# Patient Record
Sex: MALE | Race: BLACK OR AFRICAN AMERICAN | NOT HISPANIC OR LATINO | URBAN - METROPOLITAN AREA
[De-identification: names, ages, dates, MRNs, and addresses within clinical notes are randomized per-mention and may not be internally consistent; named-entity substitution may affect disease eponyms.]

---

## 2020-10-01 ENCOUNTER — HOSPITAL ENCOUNTER (EMERGENCY)
Facility: HOSPITAL | Age: 30
Discharge: PSYCHIATRIC OR SUBSTANCE USE FACILITY | End: 2020-10-01
Attending: EMERGENCY MEDICINE
Payer: OTHER GOVERNMENT

## 2020-10-01 ENCOUNTER — HOSPITAL ENCOUNTER (INPATIENT)
Facility: HOSPITAL | Age: 30
LOS: 4 days | Discharge: HOME | DRG: 880 | End: 2020-10-05
Attending: PSYCHIATRY & NEUROLOGY | Admitting: PSYCHIATRY & NEUROLOGY

## 2020-10-01 VITALS
OXYGEN SATURATION: 100 % | TEMPERATURE: 98 F | HEIGHT: 72 IN | HEART RATE: 66 BPM | BODY MASS INDEX: 23.7 KG/M2 | WEIGHT: 175 LBS | SYSTOLIC BLOOD PRESSURE: 120 MMHG | DIASTOLIC BLOOD PRESSURE: 64 MMHG | RESPIRATION RATE: 16 BRPM

## 2020-10-01 DIAGNOSIS — E87.6 HYPOKALEMIA: ICD-10-CM

## 2020-10-01 DIAGNOSIS — R45.851 SUICIDAL IDEATIONS: Primary | ICD-10-CM

## 2020-10-01 DIAGNOSIS — F19.10 SUBSTANCE ABUSE: ICD-10-CM

## 2020-10-01 DIAGNOSIS — R45.851 DEPRESSION WITH SUICIDAL IDEATION: ICD-10-CM

## 2020-10-01 DIAGNOSIS — F32.A DEPRESSION WITH SUICIDAL IDEATION: ICD-10-CM

## 2020-10-01 DIAGNOSIS — F41.9 ANXIETY: ICD-10-CM

## 2020-10-01 DIAGNOSIS — Z65.8 PSYCHOSOCIAL STRESSORS: ICD-10-CM

## 2020-10-01 DIAGNOSIS — F32.1 CURRENT MODERATE EPISODE OF MAJOR DEPRESSIVE DISORDER WITHOUT PRIOR EPISODE: Primary | ICD-10-CM

## 2020-10-01 LAB
ALBUMIN SERPL BCP-MCNC: 4.4 G/DL (ref 3.5–5.2)
ALP SERPL-CCNC: 55 U/L (ref 55–135)
ALT SERPL W/O P-5'-P-CCNC: 15 U/L (ref 10–44)
AMPHET+METHAMPHET UR QL: NEGATIVE
ANION GAP SERPL CALC-SCNC: 16 MMOL/L (ref 8–16)
APAP SERPL-MCNC: <3 UG/ML (ref 10–20)
AST SERPL-CCNC: 24 U/L (ref 10–40)
BACTERIA #/AREA URNS AUTO: NORMAL /HPF
BARBITURATES UR QL SCN>200 NG/ML: NEGATIVE
BASOPHILS # BLD AUTO: 0.03 K/UL (ref 0–0.2)
BASOPHILS NFR BLD: 0.4 % (ref 0–1.9)
BENZODIAZ UR QL SCN>200 NG/ML: NEGATIVE
BILIRUB SERPL-MCNC: 0.6 MG/DL (ref 0.1–1)
BILIRUB UR QL STRIP: NEGATIVE
BUN SERPL-MCNC: 10 MG/DL (ref 6–20)
BZE UR QL SCN: NEGATIVE
CALCIUM SERPL-MCNC: 9 MG/DL (ref 8.7–10.5)
CANNABINOIDS UR QL SCN: ABNORMAL
CHLORIDE SERPL-SCNC: 107 MMOL/L (ref 95–110)
CLARITY UR REFRACT.AUTO: CLEAR
CO2 SERPL-SCNC: 20 MMOL/L (ref 23–29)
COLOR UR AUTO: YELLOW
CREAT SERPL-MCNC: 1.4 MG/DL (ref 0.5–1.4)
CREAT UR-MCNC: 417 MG/DL (ref 23–375)
CTP QC/QA: YES
DIFFERENTIAL METHOD: ABNORMAL
EOSINOPHIL # BLD AUTO: 0 K/UL (ref 0–0.5)
EOSINOPHIL NFR BLD: 0.1 % (ref 0–8)
ERYTHROCYTE [DISTWIDTH] IN BLOOD BY AUTOMATED COUNT: 11.9 % (ref 11.5–14.5)
EST. GFR  (AFRICAN AMERICAN): >60 ML/MIN/1.73 M^2
EST. GFR  (NON AFRICAN AMERICAN): >60 ML/MIN/1.73 M^2
ETHANOL SERPL-MCNC: <10 MG/DL
GLUCOSE SERPL-MCNC: 108 MG/DL (ref 70–110)
GLUCOSE UR QL STRIP: NEGATIVE
HCT VFR BLD AUTO: 41 % (ref 40–54)
HGB BLD-MCNC: 13.8 G/DL (ref 14–18)
HGB UR QL STRIP: NEGATIVE
HYALINE CASTS UR QL AUTO: 0 /LPF
IMM GRANULOCYTES # BLD AUTO: 0.03 K/UL (ref 0–0.04)
IMM GRANULOCYTES NFR BLD AUTO: 0.4 % (ref 0–0.5)
KETONES UR QL STRIP: ABNORMAL
LEUKOCYTE ESTERASE UR QL STRIP: NEGATIVE
LYMPHOCYTES # BLD AUTO: 1.3 K/UL (ref 1–4.8)
LYMPHOCYTES NFR BLD: 16.7 % (ref 18–48)
MCH RBC QN AUTO: 28.9 PG (ref 27–31)
MCHC RBC AUTO-ENTMCNC: 33.7 G/DL (ref 32–36)
MCV RBC AUTO: 86 FL (ref 82–98)
METHADONE UR QL SCN>300 NG/ML: NEGATIVE
MICROSCOPIC COMMENT: NORMAL
MONOCYTES # BLD AUTO: 0.5 K/UL (ref 0.3–1)
MONOCYTES NFR BLD: 6.1 % (ref 4–15)
NEUTROPHILS # BLD AUTO: 6 K/UL (ref 1.8–7.7)
NEUTROPHILS NFR BLD: 76.3 % (ref 38–73)
NITRITE UR QL STRIP: NEGATIVE
NRBC BLD-RTO: 0 /100 WBC
OPIATES UR QL SCN: NEGATIVE
PCP UR QL SCN>25 NG/ML: NEGATIVE
PH UR STRIP: 6 [PH] (ref 5–8)
PLATELET # BLD AUTO: 188 K/UL (ref 150–350)
PMV BLD AUTO: 10.1 FL (ref 9.2–12.9)
POTASSIUM SERPL-SCNC: 3.3 MMOL/L (ref 3.5–5.1)
PROT SERPL-MCNC: 7.8 G/DL (ref 6–8.4)
PROT UR QL STRIP: ABNORMAL
RBC # BLD AUTO: 4.78 M/UL (ref 4.6–6.2)
RBC #/AREA URNS AUTO: 1 /HPF (ref 0–4)
SARS-COV-2 RDRP RESP QL NAA+PROBE: NEGATIVE
SODIUM SERPL-SCNC: 143 MMOL/L (ref 136–145)
SP GR UR STRIP: 1.03 (ref 1–1.03)
T4 FREE SERPL-MCNC: 1.34 NG/DL (ref 0.71–1.51)
TOXICOLOGY INFORMATION: ABNORMAL
TSH SERPL DL<=0.005 MIU/L-ACNC: 0.32 UIU/ML (ref 0.4–4)
URN SPEC COLLECT METH UR: ABNORMAL
WBC # BLD AUTO: 7.91 K/UL (ref 3.9–12.7)
WBC #/AREA URNS AUTO: 2 /HPF (ref 0–5)

## 2020-10-01 PROCEDURE — 80320 DRUG SCREEN QUANTALCOHOLS: CPT

## 2020-10-01 PROCEDURE — 11400000 HC PSYCH PRIVATE ROOM

## 2020-10-01 PROCEDURE — 25000003 PHARM REV CODE 250: Performed by: PHYSICIAN ASSISTANT

## 2020-10-01 PROCEDURE — 84439 ASSAY OF FREE THYROXINE: CPT

## 2020-10-01 PROCEDURE — U0002 COVID-19 LAB TEST NON-CDC: HCPCS | Performed by: PHYSICIAN ASSISTANT

## 2020-10-01 PROCEDURE — 80329 ANALGESICS NON-OPIOID 1 OR 2: CPT

## 2020-10-01 PROCEDURE — 99284 EMERGENCY DEPT VISIT MOD MDM: CPT | Mod: ,,, | Performed by: PHYSICIAN ASSISTANT

## 2020-10-01 PROCEDURE — 99285 EMERGENCY DEPT VISIT HI MDM: CPT

## 2020-10-01 PROCEDURE — 81001 URINALYSIS AUTO W/SCOPE: CPT

## 2020-10-01 PROCEDURE — 99284 PR EMERGENCY DEPT VISIT,LEVEL IV: ICD-10-PCS | Mod: ,,, | Performed by: PHYSICIAN ASSISTANT

## 2020-10-01 PROCEDURE — 80053 COMPREHEN METABOLIC PANEL: CPT

## 2020-10-01 PROCEDURE — 85025 COMPLETE CBC W/AUTO DIFF WBC: CPT

## 2020-10-01 PROCEDURE — 25000003 PHARM REV CODE 250: Performed by: PSYCHIATRY & NEUROLOGY

## 2020-10-01 PROCEDURE — 80307 DRUG TEST PRSMV CHEM ANLYZR: CPT

## 2020-10-01 PROCEDURE — 84443 ASSAY THYROID STIM HORMONE: CPT

## 2020-10-01 RX ORDER — MAG HYDROX/ALUMINUM HYD/SIMETH 200-200-20
30 SUSPENSION, ORAL (FINAL DOSE FORM) ORAL EVERY 6 HOURS PRN
Status: DISCONTINUED | OUTPATIENT
Start: 2020-10-01 | End: 2020-10-05 | Stop reason: HOSPADM

## 2020-10-01 RX ORDER — ACETAMINOPHEN 325 MG/1
650 TABLET ORAL EVERY 6 HOURS PRN
Status: DISCONTINUED | OUTPATIENT
Start: 2020-10-01 | End: 2020-10-05 | Stop reason: HOSPADM

## 2020-10-01 RX ORDER — LOPERAMIDE HYDROCHLORIDE 2 MG/1
2 CAPSULE ORAL
Status: DISCONTINUED | OUTPATIENT
Start: 2020-10-01 | End: 2020-10-05 | Stop reason: HOSPADM

## 2020-10-01 RX ORDER — POTASSIUM CHLORIDE 20 MEQ/1
40 TABLET, EXTENDED RELEASE ORAL
Status: COMPLETED | OUTPATIENT
Start: 2020-10-01 | End: 2020-10-01

## 2020-10-01 RX ORDER — HYDROXYZINE PAMOATE 50 MG/1
50 CAPSULE ORAL EVERY 6 HOURS PRN
Status: DISCONTINUED | OUTPATIENT
Start: 2020-10-01 | End: 2020-10-05 | Stop reason: HOSPADM

## 2020-10-01 RX ORDER — OLANZAPINE 10 MG/2ML
10 INJECTION, POWDER, FOR SOLUTION INTRAMUSCULAR EVERY 4 HOURS PRN
Status: DISCONTINUED | OUTPATIENT
Start: 2020-10-01 | End: 2020-10-05 | Stop reason: HOSPADM

## 2020-10-01 RX ORDER — FOLIC ACID 1 MG/1
1 TABLET ORAL DAILY
Status: DISCONTINUED | OUTPATIENT
Start: 2020-10-01 | End: 2020-10-05 | Stop reason: HOSPADM

## 2020-10-01 RX ORDER — DOCUSATE SODIUM 100 MG/1
100 CAPSULE, LIQUID FILLED ORAL DAILY PRN
Status: DISCONTINUED | OUTPATIENT
Start: 2020-10-01 | End: 2020-10-05 | Stop reason: HOSPADM

## 2020-10-01 RX ORDER — ONDANSETRON 8 MG/1
8 TABLET, ORALLY DISINTEGRATING ORAL
Status: COMPLETED | OUTPATIENT
Start: 2020-10-01 | End: 2020-10-01

## 2020-10-01 RX ORDER — OLANZAPINE 10 MG/1
10 TABLET ORAL EVERY 4 HOURS PRN
Status: DISCONTINUED | OUTPATIENT
Start: 2020-10-01 | End: 2020-10-05 | Stop reason: HOSPADM

## 2020-10-01 RX ADMIN — FOLIC ACID 1 MG: 1 TABLET ORAL at 04:10

## 2020-10-01 RX ADMIN — THERA TABS 1 TABLET: TAB at 04:10

## 2020-10-01 RX ADMIN — POTASSIUM CHLORIDE 40 MEQ: 1500 TABLET, EXTENDED RELEASE ORAL at 10:10

## 2020-10-01 RX ADMIN — ONDANSETRON 8 MG: 8 TABLET, ORALLY DISINTEGRATING ORAL at 09:10
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
## 2020-10-01 NOTE — ED NOTES
Pt report called to Akira Richards at [facility]. Pt is aware of his pending transfer and has notified family and friends.

## 2020-10-01 NOTE — ED NOTES
Pt transferred to  2, he is calm and cooperative. Pt checked for contraband and wearing hospital attire. Pt belongings secured. Pt endorses feelings of depression and thoughts of self harm without a specific plan. Pt denies AVH/HI's. Pt thoughts are future oriented ( he is concerned with loosing his job). Pt stated mood depressed, affect flat, he does contract against self harm. Pt appearance is well kept. Pt instructed on his pending placement to  [facility]. Pt lying in bed resting DVC maintained.
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
## 2020-10-01 NOTE — ED NOTES
Patient is resting comfortably in stretcher. Rafaela Hernandez at bedside documenting per flowsheets. All belongings locked up in closet. No medical equipment in room. Will continue to monitor.

## 2020-10-01 NOTE — ED NOTES
Pt escorted off the unit into the AA on a stretcher. AA staff given PEC, and 3 bags of pt belonginns with a security envelope. Pt remained calm and cooperative for the transfer.

## 2020-10-02 PROBLEM — E87.6 HYPOKALEMIA: Status: ACTIVE | Noted: 2020-10-02

## 2020-10-02 PROBLEM — F19.10 SUBSTANCE ABUSE: Status: ACTIVE | Noted: 2020-10-02

## 2020-10-02 LAB
CHOLEST SERPL-MCNC: 158 MG/DL (ref 120–199)
CHOLEST/HDLC SERPL: 3.8 {RATIO} (ref 2–5)
ESTIMATED AVG GLUCOSE: 111 MG/DL (ref 68–131)
HBA1C MFR BLD HPLC: 5.5 % (ref 4–5.6)
HDLC SERPL-MCNC: 42 MG/DL (ref 40–75)
HDLC SERPL: 26.6 % (ref 20–50)
LDLC SERPL CALC-MCNC: 102.6 MG/DL (ref 63–159)
NONHDLC SERPL-MCNC: 116 MG/DL
TRIGL SERPL-MCNC: 67 MG/DL (ref 30–150)

## 2020-10-02 PROCEDURE — 99223 PR INITIAL HOSPITAL CARE,LEVL III: ICD-10-PCS | Mod: ,,, | Performed by: STUDENT IN AN ORGANIZED HEALTH CARE EDUCATION/TRAINING PROGRAM

## 2020-10-02 PROCEDURE — 11400000 HC PSYCH PRIVATE ROOM

## 2020-10-02 PROCEDURE — 83036 HEMOGLOBIN GLYCOSYLATED A1C: CPT

## 2020-10-02 PROCEDURE — 99223 1ST HOSP IP/OBS HIGH 75: CPT | Mod: ,,, | Performed by: STUDENT IN AN ORGANIZED HEALTH CARE EDUCATION/TRAINING PROGRAM

## 2020-10-02 PROCEDURE — 25000003 PHARM REV CODE 250: Performed by: NURSE PRACTITIONER

## 2020-10-02 PROCEDURE — 99223 PR INITIAL HOSPITAL CARE,LEVL III: ICD-10-PCS | Mod: ,,, | Performed by: NURSE PRACTITIONER

## 2020-10-02 PROCEDURE — 25000003 PHARM REV CODE 250: Performed by: PSYCHIATRY & NEUROLOGY

## 2020-10-02 PROCEDURE — 25000003 PHARM REV CODE 250: Performed by: STUDENT IN AN ORGANIZED HEALTH CARE EDUCATION/TRAINING PROGRAM

## 2020-10-02 PROCEDURE — 36415 COLL VENOUS BLD VENIPUNCTURE: CPT

## 2020-10-02 PROCEDURE — 99223 1ST HOSP IP/OBS HIGH 75: CPT | Mod: ,,, | Performed by: NURSE PRACTITIONER

## 2020-10-02 PROCEDURE — 80061 LIPID PANEL: CPT

## 2020-10-02 RX ORDER — ESCITALOPRAM OXALATE 10 MG/1
10 TABLET ORAL DAILY
Status: DISCONTINUED | OUTPATIENT
Start: 2020-10-02 | End: 2020-10-05 | Stop reason: HOSPADM

## 2020-10-02 RX ORDER — POTASSIUM CHLORIDE 750 MG/1
30 TABLET, EXTENDED RELEASE ORAL ONCE
Status: COMPLETED | OUTPATIENT
Start: 2020-10-02 | End: 2020-10-02

## 2020-10-02 RX ADMIN — POTASSIUM CHLORIDE 30 MEQ: 750 TABLET, FILM COATED, EXTENDED RELEASE ORAL at 01:10

## 2020-10-02 RX ADMIN — THERA TABS 1 TABLET: TAB at 08:10

## 2020-10-02 RX ADMIN — FOLIC ACID 1 MG: 1 TABLET ORAL at 08:10

## 2020-10-02 RX ADMIN — ESCITALOPRAM OXALATE 10 MG: 10 TABLET ORAL at 06:10
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
## 2020-10-02 NOTE — SUBJECTIVE & OBJECTIVE
Past Medical History:   Diagnosis Date    Depression     Not formally diagnosed       No past surgical history on file.    Review of patient's allergies indicates:  No Known Allergies    No current facility-administered medications on file prior to encounter.      No current outpatient medications on file prior to encounter.     Family History     None        Tobacco Use    Smoking status: Not on file   Substance and Sexual Activity    Alcohol use: Not on file    Drug use: Not on file    Sexual activity: Not on file     Review of Systems   Constitutional: Negative for chills and fever.   HENT: Negative for congestion and sore throat.    Respiratory: Negative for cough, chest tightness and shortness of breath.    Cardiovascular: Negative for chest pain, palpitations and leg swelling.   Gastrointestinal: Negative for abdominal pain, diarrhea, nausea and vomiting.   Genitourinary: Negative for flank pain, frequency and hematuria.   Musculoskeletal: Negative for back pain and neck pain.   Skin: Negative for rash and wound.   Neurological: Negative for dizziness, seizures, syncope and headaches.   Psychiatric/Behavioral: Positive for decreased concentration and suicidal ideas. Negative for agitation, confusion, self-injury and sleep disturbance.     Objective:     Vital Signs (Most Recent):  Temp: 98.4 °F (36.9 °C) (10/02/20 0747)  Pulse: 71 (10/02/20 0747)  Resp: 18 (10/02/20 0747)  BP: (!) 113/53 (10/02/20 0747)  SpO2: 99 % (10/01/20 1546) Vital Signs (24h Range):  Temp:  [98 °F (36.7 °C)-98.4 °F (36.9 °C)] 98.4 °F (36.9 °C)  Pulse:  [66-87] 71  Resp:  [16-18] 18  SpO2:  [99 %] 99 %  BP: (113-132)/(53-73) 113/53     Weight: 79.4 kg (175 lb)  Body mass index is 23.73 kg/m².    Physical Exam  Vitals signs and nursing note reviewed.   Constitutional:       General: He is not in acute distress.     Appearance: He is well-developed.   HENT:      Head: Normocephalic and atraumatic.   Eyes:      Pupils: Pupils are equal,  round, and reactive to light.   Neck:      Thyroid: No thyromegaly.   Cardiovascular:      Rate and Rhythm: Normal rate and regular rhythm.      Heart sounds: Normal heart sounds. No murmur.   Pulmonary:      Effort: Pulmonary effort is normal. No respiratory distress.      Breath sounds: Normal breath sounds. No wheezing or rales.   Abdominal:      General: Bowel sounds are normal. There is no distension.      Palpations: Abdomen is soft. There is no mass.      Tenderness: There is no abdominal tenderness.   Musculoskeletal: Normal range of motion.         General: No deformity.   Lymphadenopathy:      Cervical: No cervical adenopathy.   Skin:     General: Skin is warm and dry.      Findings: No erythema or rash.   Neurological:      Mental Status: He is alert and oriented to person, place, and time.      Comments: CN II visual fields full to confrontation  CN III, Iv, VI- pupils ERRL   CN III- no palsy  Nystagmus; none   Diplopia- none  ophthalmoparesis- none  CN V- facial sensation intact  CN VII- facial expression full and symmetric  CNVIII- normal  CN IV-Normal  CN X- normal  CN XI- Normal   CN XII normal      Psychiatric:         Mood and Affect: Mood normal.         Behavior: Behavior normal.      Comments: Cooperative and pleasant          Significant Labs:   UPT  No results found for this or any previous visit.  U/A  No results found for this or any previous visit.  UDS  Results for orders placed or performed during the hospital encounter of 10/01/20   Drug screen panel, emergency   Result Value Ref Range    Benzodiazepines Negative     Methadone metabolites Negative     Cocaine (Metab.) Negative     Opiate Scrn, Ur Negative     Barbiturate Screen, Ur Negative     Amphetamine Screen, Ur Negative     THC Presumptive Positive     Phencyclidine Negative     Creatinine, Random Ur 417.0 (H) 23.0 - 375.0 mg/dL    Toxicology Information SEE COMMENT      CBC  Results for orders placed or performed during the  hospital encounter of 10/01/20   CBC auto differential   Result Value Ref Range    WBC 7.91 3.90 - 12.70 K/uL    RBC 4.78 4.60 - 6.20 M/uL    Hemoglobin 13.8 (L) 14.0 - 18.0 g/dL    Hematocrit 41.0 40.0 - 54.0 %    Mean Corpuscular Volume 86 82 - 98 fL    Mean Corpuscular Hemoglobin 28.9 27.0 - 31.0 pg    Mean Corpuscular Hemoglobin Conc 33.7 32.0 - 36.0 g/dL    RDW 11.9 11.5 - 14.5 %    Platelets 188 150 - 350 K/uL    MPV 10.1 9.2 - 12.9 fL    Immature Granulocytes 0.4 0.0 - 0.5 %    Gran # (ANC) 6.0 1.8 - 7.7 K/uL    Immature Grans (Abs) 0.03 0.00 - 0.04 K/uL    Lymph # 1.3 1.0 - 4.8 K/uL    Mono # 0.5 0.3 - 1.0 K/uL    Eos # 0.0 0.0 - 0.5 K/uL    Baso # 0.03 0.00 - 0.20 K/uL    nRBC 0 0 /100 WBC    Gran% 76.3 (H) 38.0 - 73.0 %    Lymph% 16.7 (L) 18.0 - 48.0 %    Mono% 6.1 4.0 - 15.0 %    Eosinophil% 0.1 0.0 - 8.0 %    Basophil% 0.4 0.0 - 1.9 %    Differential Method Automated      CMP  Results for orders placed or performed during the hospital encounter of 10/01/20   Comprehensive metabolic panel   Result Value Ref Range    Sodium 143 136 - 145 mmol/L    Potassium 3.3 (L) 3.5 - 5.1 mmol/L    Chloride 107 95 - 110 mmol/L    CO2 20 (L) 23 - 29 mmol/L    Glucose 108 70 - 110 mg/dL    BUN, Bld 10 6 - 20 mg/dL    Creatinine 1.4 0.5 - 1.4 mg/dL    Calcium 9.0 8.7 - 10.5 mg/dL    Total Protein 7.8 6.0 - 8.4 g/dL    Albumin 4.4 3.5 - 5.2 g/dL    Total Bilirubin 0.6 0.1 - 1.0 mg/dL    Alkaline Phosphatase 55 55 - 135 U/L    AST 24 10 - 40 U/L    ALT 15 10 - 44 U/L    Anion Gap 16 8 - 16 mmol/L    eGFR if African American >60.0 >60 mL/min/1.73 m^2    eGFR if non African American >60.0 >60 mL/min/1.73 m^2     TSH  Results for orders placed or performed during the hospital encounter of 10/01/20   TSH   Result Value Ref Range    TSH 0.324 (L) 0.400 - 4.000 uIU/mL     ETOH  Results for orders placed or performed during the hospital encounter of 10/01/20   Ethanol   Result Value Ref Range    Alcohol, Medical, Serum <10 <10  mg/dL     Salicylate  No results found for this or any previous visit.  Acetaminophen  Results for orders placed or performed during the hospital encounter of 10/01/20   Acetaminophen level   Result Value Ref Range    Acetaminophen (Tylenol), Serum <3.0 (L) 10.0 - 20.0 ug/mL       Significant Imaging: none
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
## 2020-10-02 NOTE — PLAN OF CARE
Pt calm and cooperative.  Denies any S/I or H/I at this time.  Behavior appropriate.  Interacting well with others.  No acute distress apparent at this time, will continue to monitor.

## 2020-10-02 NOTE — PLAN OF CARE
The bio psychosocial assessment was not done on this patient due to the patient being discharged less than 72 hours after admit.    The doctor decided not to discharge the patient at this time.

## 2020-10-02 NOTE — PLAN OF CARE
Recommendation:  1. Encourage adequate consumption of meals and snacks.   2. RD to follow up    Interventions:  General healthful diet    Goals: 1. Pt to consume at least % of meals and snacks by discharge.  Nutrition Goal Status: new    Nutrition Discharge Planning: Regular Diet    Yamilet Sotelo, Dietetic Intern

## 2020-10-02 NOTE — CONSULTS
[facility]  Adult Nutrition  Consult Note    SUMMARY   Recommendations  Recommendation:  1. Encourage adequate consumption of meals and snacks.   2. RD to follow up    Goals: 1. Pt to consume at least % of meals and snacks by discharge.  Nutrition Goal Status: new  Communication of RD Recs: (POC)    Reason for Assessment  Reason For Assessment: consult  Diagnosis: psychological disorder  Relevant Medical History: Anxiety, constipation, heartburn  Interdisciplinary Rounds: did not attend    General Information Comments: Pt was admitted due to psych status. Per PA note, Pt reports nausea and vomiting. Per RN note, Pt accepts snacks. He consumes 100% of meals and snacks since admission yesterday. There are no significant weight changes recently. NFPE not performed due to psych status.     Nutrition Discharge Planning: Regular Diet    Nutrition Risk Screen  Nutrition Risk Screen: no indicators present    Nutrition/Diet History  Food Allergies: NKFA    Anthropometrics  Temp: 98.4 °F (36.9 °C)  Height Method: Measured  Height: 6' (182.9 cm)  Height (inches): 72 in  Weight Method: Standard Scale  Weight: 79.4 kg (175 lb)  Weight (lb): 175 lb  Ideal Body Weight (IBW), Male: 178 lb  % Ideal Body Weight, Male (lb): 98.31 %  BMI (Calculated): 23.7  BMI Grade: 18.5-24.9 - normal     Lab/Procedures/Meds  Pertinent Labs Reviewed: reviewed  Pertinent Labs Comments: potassium 3.3, CO2 20, TSH 0.324, creatinine 417  Pertinent Medications Reviewed: reviewed  Pertinent Medications Comments: Folic Acid, MVI    Estimated/Assessed Needs  Weight Used For Calorie Calculations: 79.4 kg (175 lb 0.7 oz)  Energy Calorie Requirements (kcal): 2529  Energy Need Method: [place] Jeor(*1.4)  Protein Requirements: 63-79 g pro(0.8-1.0 g/kg)  Weight Used For Protein Calculations: 79.4 kg (175 lb 0.7 oz)        RDA Method (mL): 2529     Nutrition Prescription Ordered  Current Diet Order: Regular Diet    Evaluation of  Received Nutrient/Fluid Intake  Energy Calories Required: meeting needs  Protein Required: meeting needs  Fluid Required: meeting needs  % Intake of Estimated Energy Needs: 75 - 100 %  % Meal Intake: 75 - 100 %    Nutrition Risk  Level of Risk/Frequency of Follow-up: low     Assessment and Plan  Nutrition Problem:  No nutrition dx at this time    Interventions:  General healthful diet    Nutrition Diagnosis Status:   New     Monitor and Evaluation  Food and Nutrient Intake: food and beverage intake  Food and Nutrient Adminstration: diet order  Nutrition-Focused Physical Findings: overall appearance     Nutrition Follow-Up    RD Follow-up?: Yes     Yamilet Sotelo, Dietetic Intern

## 2020-10-02 NOTE — PLAN OF CARE
Behavior:  Patient attended and participated in psychotherapy group today. He was dressed in his own clothes and was wearing a mask.    Intervention:  The story There's a Hole in My Sidewalk: The Romance of Self-Discovery by Chelle Flores, was used to discuss habits, denial, and solutions to change in psychotherapy group this date.      Response:  The patient acknowledged that denial delays solutions.    Plan:   To continue to encourage patient to attend group psychotherapy and to learn more about ways that he may identify solutions to problems to help him change a behavior.

## 2020-10-02 NOTE — PROGRESS NOTES
10/02/20 1040   [facility] Group Therapy   Group Name Therapeutic Recreation   Specific Interventions Cognitive Stimulation Training   Participation Level Appropriate;Attentive;Sharing   Participation Quality Cooperative;Social   Insight/Motivation Applies New Skills;Good   Affect/Mood Display Appropriate   Cognition Alert   Psychomotor WNL   Patient shows interest and initial ability to comprehend directions, wrote and shared answers, becomes involved, enjoys the activity

## 2020-10-03 PROCEDURE — 99232 PR SUBSEQUENT HOSPITAL CARE,LEVL II: ICD-10-PCS | Mod: ,,, | Performed by: PSYCHIATRY & NEUROLOGY

## 2020-10-03 PROCEDURE — 99232 SBSQ HOSP IP/OBS MODERATE 35: CPT | Mod: ,,, | Performed by: PSYCHIATRY & NEUROLOGY

## 2020-10-03 PROCEDURE — 90833 PSYTX W PT W E/M 30 MIN: CPT | Mod: ,,, | Performed by: PSYCHIATRY & NEUROLOGY

## 2020-10-03 PROCEDURE — 11400000 HC PSYCH PRIVATE ROOM

## 2020-10-03 PROCEDURE — 90833 PR PSYCHOTHERAPY W/PATIENT W/E&M, 30 MIN (ADD ON): ICD-10-PCS | Mod: ,,, | Performed by: PSYCHIATRY & NEUROLOGY

## 2020-10-03 PROCEDURE — 25000003 PHARM REV CODE 250: Performed by: STUDENT IN AN ORGANIZED HEALTH CARE EDUCATION/TRAINING PROGRAM

## 2020-10-03 PROCEDURE — 25000003 PHARM REV CODE 250: Performed by: PSYCHIATRY & NEUROLOGY

## 2020-10-03 RX ADMIN — THERA TABS 1 TABLET: TAB at 08:10

## 2020-10-03 RX ADMIN — FOLIC ACID 1 MG: 1 TABLET ORAL at 08:10

## 2020-10-03 RX ADMIN — ESCITALOPRAM OXALATE 10 MG: 10 TABLET ORAL at 08:10

## 2020-10-03 NOTE — PLAN OF CARE
Pt calm, cooperative and very pleasant. Accepts medication and meals without difficulty. Interacting well with staff. Minimal interactions with peers. Mostly isolated to room. Appropriate behavior. Denies SI, HI and AVH. NAD. Will continue to monitor for safety.

## 2020-10-03 NOTE — PROGRESS NOTES
PSYCHIATRY DAILY INPATIENT PROGRESS NOTE  SUBSEQUENT HOSPITAL VISIT    ENCOUNTER DATE: 10/3/2020  SITE: [facility]    DATE OF ADMISSION: 10/1/2020  3:37 PM  LENGTH OF STAY: 2 days      HISTORY    CHIEF COMPLAINT   Víctor Salazar is a 29 y.o. male, seen during daily hernandez rounds on the inpatient unit.  Víctor Salazar presents with the chief complaint of thoughts of death/suicide    HPI   (Elements: Location, Quality, Severity, Duration, Timing, Content, Modifying Factors, Associated Signs & Symptoms)    The patient was seen and examined. The chart was reviewed.     Reviewed notes by RN, LMSW, CTRS, MD, and Andrea from the last 24 hours.    The patient's case was discussed with the treatment team and care providers today, including RNs.    Staff reports no behavioral or management issues.     The patient has been compliant with treatment. The patient denied any side effects.    The patient reports that he is feeling better than when he came. He reports improving symptoms as documented below.     Continued but less Symptoms of Depression: diminished mood or loss of interest/anhedonia - less; diminished energy - No, change in sleep - No, change in appetite - No, diminished concentration or cognition or indecisiveness - No, PMA/R -  No, excessive guilt or hopelessness or worthlessness - No, suicidal ideations - less     Denied Changes in Sleep: trouble with initiation- No, maintenance, - No early morning awakening with inability to return to sleep - No, hypersomnolence - No     Denied Suicidal- active/passive ideations - No, organized plans- no , future intentions - No; pt is denying being suicidal and now reports that he was referring to a past episode when his mother . He is more hopeful and future oriented and goal directed. He cites his family as what keeps him form acting on suicidal thoughts.      Denied Homicidal ideations: active/passive ideations - No, organized plans- no , future  intentions - No     Denied Symptoms of psychosis: hallucinations - No, delusions - No, disorganized thinking - No, disorganized behavior or abnormal motor behavior - No,  negative symptoms (diminshed emotional expression, avolition, anhedonia, alogia, asociality) - No      Denied Symptoms of deep or hypomania: elevated, expansive, or irritable mood with increased energy or activity - No; with inflated self-esteem or grandiosity - No, decreased need for sleep - No, increased rate of speech - No, FOI or racing thoughts - No, distractibility - No, increased goal directed activity or PMA - No, risky/disinhibited behavior - No     Denied Symptoms of Anxiety: excessive anxiety/worry/fear - No; restlessness - No, fatigue - No, poor concentration - No, irritability - No, muscle tension - No, sleep disturbance - No; recurrent panic attacks- No, precipitated - No, un-precipitated - No, source of worry and/or behavioral changes secondary - No, agoraphobia - No    PSYCHOTHERAPY ADD-ON +[id number]   30 (16-37*) minutes    Time: 16 minutes  Participants: Met with patient    Therapeutic Intervention Type: behavior modifying psychotherapy, supportive psychotherapy  Why chosen therapy is appropriate versus another modality: relevant to diagnosis, patient responds to this modality, evidence based practice    Target symptoms: depression, anxiety   Primary focus: psychosocial stressors  Psychotherapeutic techniques: supportive techniques; evaluating tx gaols    Outcome monitoring methods: self-report, observation    Patient's response to intervention:  The patient's response to intervention is accepting.    Progress toward goals:  The patient's progress toward goals is fair .          ROS  General ROS: negative  Ophthalmic ROS: negative  ENT ROS: negative  Allergy and Immunology ROS: negative  Hematological and Lymphatic ROS: negative  Endocrine ROS: negative  Respiratory ROS: no cough, shortness of breath, or wheezing  Cardiovascular ROS:  "no chest pain or dyspnea on exertion  Gastrointestinal ROS: no abdominal pain, change in bowel habits, or black or bloody stools  Genito-Urinary ROS: no dysuria, trouble voiding, or hematuria  Musculoskeletal ROS: negative  Neurological ROS: no TIA or stroke symptoms  Dermatological ROS: negative    PAST MEDICAL HISTORY   Past Medical History:   Diagnosis Date    Depression     Not formally diagnosed           PSYCHOTROPIC MEDICATIONS   Scheduled Meds:   escitalopram oxalate  10 mg Oral Daily    folic acid  1 mg Oral Daily    multivitamin  1 tablet Oral Daily     Continuous Infusions:  PRN Meds:.acetaminophen, aluminum-magnesium hydroxide-simethicone, docusate sodium, hydrOXYzine pamoate, loperamide, OLANZapine **AND** OLANZapine        EXAMINATION    VITALS   Vitals:    10/03/20 0800   BP: 118/66   Pulse: 66   Resp: 18   Temp: 98.2 °F (36.8 °C)     Body mass index is 23.73 kg/m².      CONSTITUTIONAL  General Appearance: unremarkable, age appropriate, normal weight, in casual attire     MUSCULOSKELETAL  Muscle Strength and Tone: normal  Abnormal Involuntary Movements: None  Gait and Station: non-ataxic; normal     PSYCHIATRIC   Level of Consciousness: awake and alert   Orientation: person, place and situation  Grooming: Casually dressed and Well groomed  Psychomotor Behavior: normal, cooperative  Speech: normal tone, normal rate, normal pitch, normal volume  Language: grossly intact; English, fluent  Mood: "ok"  Affect: Consistent with mood, calmer, less anxious  Thought Process: linear, logical, organized, goal directed  Associations: intact; no cleo  Thought Content: DENIES suicidal ideation and DENIES homicidal ideation  Perceptions: denies hallucinations/delusions  Memory: Able to recall past events, Remote intact and Recent intact  Attention:Attends to interview without distraction  Fund of Knowledge: Aware of current events and Vocabulary appropriate   Estimate if Intelligence:  Average based on " work/education history, vocabulary and mental status exam  Insight: fair- has awareness of illness  Judgment: fair- behavior is adequate to circumstan      DIAGNOSTIC TESTING   Laboratory Results  No results found for this or any previous visit (from the past 24 hour(s)).      ASSESSMENT      IMPRESSION   Suicidal ideations  Other specified anxiety disorder (limited symptoms)  Other specified depressive disorder (limited symptoms)  Psychosocial stressors     MEDICAL DECISION MAKING       PROBLEM LIST AND MANAGEMENT PLANS     Suicidal ideations  - continue psychiatric hospitalization  - provide psychotherapeutic interventions and medication management     Other specified anxiety disorder (limited symptoms)  - started/cotinue Lexapro 10 mg PO qd   - follow up with outpatient MH care after discharge     Other specified depressive disorder (limited symptoms)  -  Lexapro asa bishop  - follow up with outpatient  care after discharge     Psychosocial stressors  - pt counseled  - SW consulted for assistance with resources     Hypokalemia  - FM consulted, k repleted PO  - monitor       PRESCRIPTION DRUG MANAGEMENT  Compliance: yes  Side Effects: no  Regimen Adjustments: see above     Discussed diagnosis, risks and benefits of proposed treatment vs alternative treatments vs no treatment, potential side effects of these treatments and the inherent unpredictability of treatment. The patient expresses understanding of the above and displays the capacity to agree with this treatment given said understanding. Patient also agrees that, currently, the benefits outweigh the risks and would like to pursue/continue treatment at this time.        DIAGNOSTIC TESTING  Labs reviewed with patient; follow up pending labs     Disposition:  -Will attempt to obtain outside psychiatric records if available  -SW to assist with aftercare planning and collateral  -Once stable discharge home with outpatient follow up care and/or rehab  -Continue  inpatient treatment under a PEC and/or CEC for danger to self/ danger to others/grave disability as evident by danger to self       NEED FOR CONTINUED HOSPITALIZATION  Psychiatric illness continues to pose a potential threat to life or bodily function, of self or others, thereby requiring the need for continued inpatient psychiatric hospitalization: Yes    Protective inpatient pyschiatric hospitalization required while a safe disposition plan is enacted: Yes    Patient stabilized and ready for discharge from inpatient psychiatric unit: No      STAFF:   Leander Salcido MD  Psychiatry

## 2020-10-03 NOTE — PLAN OF CARE
Lying quietly in bed, eyes closed, respirations even, unlabored. Apparently asleep. Slept 6.5 hours thus far with no interruptions. Safety precautions maintained. Rounds done every 15 minutes. Bed is fixed in low position and room is uncluttered and pathways are clear.

## 2020-10-03 NOTE — PLAN OF CARE
Plan of care reviewed. Denies intent to harm self or others at this time. Accepts snacks and medications. Gait steady, no falls. Interacting with staff and peers. [facility] is looking forward to being discharged soon. States feels he is better and wants to go home and take responsibility for pregnant girlfriend. Promoted individualized safety plan, reality-based interactions, effective coping strategies, and impulse control. Will continue precautions and monitor for safety.

## 2020-10-04 PROBLEM — R45.851 DEPRESSION WITH SUICIDAL IDEATION: Status: RESOLVED | Noted: 2020-10-01 | Resolved: 2020-10-04

## 2020-10-04 PROBLEM — F32.1 CURRENT MODERATE EPISODE OF MAJOR DEPRESSIVE DISORDER WITHOUT PRIOR EPISODE: Status: ACTIVE | Noted: 2020-10-04

## 2020-10-04 PROBLEM — F32.A DEPRESSION WITH SUICIDAL IDEATION: Status: RESOLVED | Noted: 2020-10-01 | Resolved: 2020-10-04

## 2020-10-04 PROCEDURE — 99233 SBSQ HOSP IP/OBS HIGH 50: CPT | Mod: ,,, | Performed by: PSYCHIATRY & NEUROLOGY

## 2020-10-04 PROCEDURE — 11400000 HC PSYCH PRIVATE ROOM

## 2020-10-04 PROCEDURE — 25000003 PHARM REV CODE 250: Performed by: PSYCHIATRY & NEUROLOGY

## 2020-10-04 PROCEDURE — 99233 PR SUBSEQUENT HOSPITAL CARE,LEVL III: ICD-10-PCS | Mod: ,,, | Performed by: PSYCHIATRY & NEUROLOGY

## 2020-10-04 PROCEDURE — 25000003 PHARM REV CODE 250: Performed by: STUDENT IN AN ORGANIZED HEALTH CARE EDUCATION/TRAINING PROGRAM

## 2020-10-04 RX ORDER — ESCITALOPRAM OXALATE 10 MG/1
10 TABLET ORAL DAILY
Qty: 30 TABLET | Refills: 2 | Status: SHIPPED | OUTPATIENT
Start: 2020-10-05 | End: 2020-10-04

## 2020-10-04 RX ORDER — ESCITALOPRAM OXALATE 10 MG/1
10 TABLET ORAL DAILY
Qty: 30 TABLET | Refills: 2 | Status: SHIPPED | OUTPATIENT
Start: 2020-10-05 | End: 2021-10-05

## 2020-10-04 RX ADMIN — FOLIC ACID 1 MG: 1 TABLET ORAL at 08:10

## 2020-10-04 RX ADMIN — ESCITALOPRAM OXALATE 10 MG: 10 TABLET ORAL at 08:10

## 2020-10-04 RX ADMIN — THERA TABS 1 TABLET: TAB at 08:10

## 2020-10-04 NOTE — PLAN OF CARE
"Pt has been out of room sitting with peer watching tv.  Has pt gown covering head.  Very polite and pleasant.  Denies SI/HI/hallucinations.  Says he is feeling "100% better"  Plans on dc Monday and go to baby momma and get back to his job.  No complaints voiced.  No distress noted.  Will continue to monitor for safety.  "

## 2020-10-04 NOTE — PROGRESS NOTES
PSYCHIATRY DAILY INPATIENT PROGRESS NOTE  SUBSEQUENT HOSPITAL VISIT    ENCOUNTER DATE: 10/4/2020  SITE: [facility]    DATE OF ADMISSION: 10/1/2020  3:37 PM  LENGTH OF STAY: 3 days      HISTORY    CHIEF COMPLAINT   Víctor Salazar is a 29 y.o. male, seen during daily hernandez rounds on the inpatient unit.  Víctor Salazar presents with the chief complaint of thoughts of death/suicide    HPI   (Elements: Location, Quality, Severity, Duration, Timing, Content, Modifying Factors, Associated Signs & Symptoms)    The patient was seen and examined. The chart was reviewed.     Reviewed notes by RNs from the last 24 hours.    The patient's case was discussed with the treatment team and care providers today, including RNs.    Staff reports no behavioral or management issues.     The patient has been compliant with treatment. The patient denied any side effects.    The patient reports that he continues feeling better each day. He reports improving symptoms as documented below. He noted that he has had positive conversations with his girlfriend    Improved Symptoms of Depression: diminished mood or loss of interest/anhedonia - less; diminished energy - No, change in sleep - No, change in appetite - No, diminished concentration or cognition or indecisiveness - No, PMA/R -  No, excessive guilt or hopelessness or worthlessness - No, suicidal ideations - denied     Denied Changes in Sleep: trouble with initiation- No, maintenance, - No early morning awakening with inability to return to sleep - No, hypersomnolence - No     Denied Suicidal- active/passive ideations - No, organized plans- no , future intentions - No; pt is denying being suicidal and  reports that he was referring to a past episode when his mother . He is more hopeful and future oriented and goal directed. He cites his family as what keeps him from acting on suicidal thoughts.   -events were triggered by an argument with his girlfriend- they are  reportedly doing better,      Denied Homicidal ideations: active/passive ideations - No, organized plans- no , future intentions - No     Denied Symptoms of psychosis: hallucinations - No, delusions - No, disorganized thinking - No, disorganized behavior or abnormal motor behavior - No,  negative symptoms (diminshed emotional expression, avolition, anhedonia, alogia, asociality) - No      Denied Symptoms of deep or hypomania: elevated, expansive, or irritable mood with increased energy or activity - No; with inflated self-esteem or grandiosity - No, decreased need for sleep - No, increased rate of speech - No, FOI or racing thoughts - No, distractibility - No, increased goal directed activity or PMA - No, risky/disinhibited behavior - No     Denied Symptoms of Anxiety: excessive anxiety/worry/fear - No; restlessness - No, fatigue - No, poor concentration - No, irritability - No, muscle tension - No, sleep disturbance - No; recurrent panic attacks- No, precipitated - No, un-precipitated - No, source of worry and/or behavioral changes secondary - No, agoraphobia - No    PSYCHOTHERAPY ADD-ON +[id number]   30 (16-37*) minutes    Time: 16 minutes  Participants: Met with patient    Therapeutic Intervention Type: behavior modifying psychotherapy, supportive psychotherapy  Why chosen therapy is appropriate versus another modality: relevant to diagnosis, patient responds to this modality, evidence based practice    Target symptoms: depression, anxiety   Primary focus: psychosocial stressors  Psychotherapeutic techniques: supportive techniques; evaluating tx gaols    Outcome monitoring methods: self-report, observation    Patient's response to intervention:  The patient's response to intervention is accepting.    Progress toward goals:  The patient's progress toward goals is fair .          ROS  General ROS: negative  Ophthalmic ROS: negative  ENT ROS: negative  Allergy and Immunology ROS: negative  Hematological and Lymphatic  "ROS: negative  Endocrine ROS: negative  Respiratory ROS: no cough, shortness of breath, or wheezing  Cardiovascular ROS: no chest pain or dyspnea on exertion  Gastrointestinal ROS: no abdominal pain, change in bowel habits, or black or bloody stools  Genito-Urinary ROS: no dysuria, trouble voiding, or hematuria  Musculoskeletal ROS: negative  Neurological ROS: no TIA or stroke symptoms  Dermatological ROS: negative    PAST MEDICAL HISTORY   Past Medical History:   Diagnosis Date    Depression     Not formally diagnosed           PSYCHOTROPIC MEDICATIONS   Scheduled Meds:   escitalopram oxalate  10 mg Oral Daily    folic acid  1 mg Oral Daily    multivitamin  1 tablet Oral Daily     Continuous Infusions:  PRN Meds:.acetaminophen, aluminum-magnesium hydroxide-simethicone, docusate sodium, hydrOXYzine pamoate, loperamide, OLANZapine **AND** OLANZapine        EXAMINATION    VITALS   Vitals:    10/04/20 0800   BP: (!) 118/57   Pulse: 77   Resp: 18   Temp: 98.6 °F (37 °C)     Body mass index is 23.02 kg/m².      CONSTITUTIONAL  General Appearance: unremarkable, age appropriate, normal weight, in casual attire     MUSCULOSKELETAL  Muscle Strength and Tone: normal  Abnormal Involuntary Movements: None  Gait and Station: non-ataxic; normal     PSYCHIATRIC   Level of Consciousness: awake and alert   Orientation: person, place and situation  Grooming: Casually dressed and Well groomed  Psychomotor Behavior: normal, cooperative  Speech: normal tone, normal rate, normal pitch, normal volume  Language: grossly intact; English, fluent  Mood: "ok"  Affect: improving range; calmer, less anxious/dysthymic  Thought Process: linear, logical, organized, goal directed  Associations: intact; no cleo  Thought Content: DENIES suicidal ideation and DENIES homicidal ideation  Perceptions: denies hallucinations/delusions  Memory: Able to recall past events, Remote intact and Recent intact  Attention:Attends to interview without " distraction  Fund of Knowledge: Aware of current events and Vocabulary appropriate   Estimate if Intelligence:  Average based on work/education history, vocabulary and mental status exam  Insight: improving/good- has awareness of illness  Judgment: improving/good- behavior is adequate to circumstan      DIAGNOSTIC TESTING   Laboratory Results  No results found for this or any previous visit (from the past 24 hour(s)).      ASSESSMENT      IMPRESSION   Suicidal ideations  Other specified anxiety disorder (limited symptoms)  Other specified depressive disorder (limited symptoms)  Psychosocial stressors     MEDICAL DECISION MAKING       PROBLEM LIST AND MANAGEMENT PLANS     Suicidal ideations  - continue psychiatric hospitalization  - provide psychotherapeutic interventions and medication management     Other specified anxiety disorder (limited symptoms)  - started/cotinue Lexapro 10 mg PO qday  - follow up with outpatient  care after discharge     Other specified depressive disorder (limited symptoms)  -  Lexapro asa bishop  - follow up with outpatient  care after discharge     Psychosocial stressors  - pt counseled  - SW consulted for assistance with resources     Hypokalemia  - FM consulted, k repleted PO  - monitor       PRESCRIPTION DRUG MANAGEMENT  Compliance: yes  Side Effects: no  Regimen Adjustments: see above     Discussed diagnosis, risks and benefits of proposed treatment vs alternative treatments vs no treatment, potential side effects of these treatments and the inherent unpredictability of treatment. The patient expresses understanding of the above and displays the capacity to agree with this treatment given said understanding. Patient also agrees that, currently, the benefits outweigh the risks and would like to pursue/continue treatment at this time.        DIAGNOSTIC TESTING  Labs reviewed with patient; follow up pending labs     Disposition:  -Will attempt to obtain outside psychiatric records if  available  -SW to assist with aftercare planning and collateral  -Once stable discharge home with outpatient follow up care and/or rehab  -Continue inpatient treatment under a PEC and/or CEC for danger to self/ danger to others/grave disability as evident by danger to self  -The patient is improving and will be stable for discharge home tomorrow and transitioning to outpatient treatment tomorrow.       NEED FOR CONTINUED HOSPITALIZATION  Psychiatric illness continues to pose a potential threat to life or bodily function, of self or others, thereby requiring the need for continued inpatient psychiatric hospitalization: Yes    Protective inpatient pyschiatric hospitalization required while a safe disposition plan is enacted: Yes    Patient stabilized and ready for discharge from inpatient psychiatric unit: No      STAFF:   Leander Salcido MD  Psychiatry

## 2020-10-04 NOTE — PLAN OF CARE
Pt calm, cooperative and polite. Interacting well with staff and peers. Meal and medication compliant. Good mood. Watching [employer] game in dayroom with peers. Reports ready for discharge tomorrow. Denies SI, HI and PRICILLAH. Discussed healthy coping skills and techniques. NAD. Will continue to monitor for safety.

## 2020-10-04 NOTE — PLAN OF CARE
Pt is sleeping at this time and has slept 6.5 hours uninterrupted.  NAD.  Resp even & unlabored.  Pathways clear and bed in low position.  Q 15 minute safety checks ongoing.  All precautions maintained

## 2020-10-05 VITALS
RESPIRATION RATE: 18 BRPM | DIASTOLIC BLOOD PRESSURE: 62 MMHG | BODY MASS INDEX: 22.99 KG/M2 | TEMPERATURE: 99 F | HEART RATE: 69 BPM | HEIGHT: 72 IN | OXYGEN SATURATION: 98 % | SYSTOLIC BLOOD PRESSURE: 139 MMHG | WEIGHT: 169.75 LBS

## 2020-10-05 PROCEDURE — 99239 HOSP IP/OBS DSCHRG MGMT >30: CPT | Mod: ,,, | Performed by: PSYCHIATRY & NEUROLOGY

## 2020-10-05 PROCEDURE — 90833 PR PSYCHOTHERAPY W/PATIENT W/E&M, 30 MIN (ADD ON): ICD-10-PCS | Mod: ,,, | Performed by: PSYCHIATRY & NEUROLOGY

## 2020-10-05 PROCEDURE — 25000003 PHARM REV CODE 250: Performed by: PSYCHIATRY & NEUROLOGY

## 2020-10-05 PROCEDURE — 90833 PSYTX W PT W E/M 30 MIN: CPT | Mod: ,,, | Performed by: PSYCHIATRY & NEUROLOGY

## 2020-10-05 PROCEDURE — 25000003 PHARM REV CODE 250: Performed by: STUDENT IN AN ORGANIZED HEALTH CARE EDUCATION/TRAINING PROGRAM

## 2020-10-05 PROCEDURE — 99239 PR HOSPITAL DISCHARGE DAY,>30 MIN: ICD-10-PCS | Mod: ,,, | Performed by: PSYCHIATRY & NEUROLOGY

## 2020-10-05 RX ADMIN — ESCITALOPRAM OXALATE 10 MG: 10 TABLET ORAL at 09:10

## 2020-10-05 RX ADMIN — THERA TABS 1 TABLET: TAB at 09:10

## 2020-10-05 RX ADMIN — FOLIC ACID 1 MG: 1 TABLET ORAL at 09:10

## 2020-10-05 NOTE — PLAN OF CARE
Pt has slept 6 hours with one interruptions so far.  NAD.  Resp even & unlabored.  Pathways clear and bed is low.  Q 15 minute safety checks ongoing.  All precautions maintained.
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
## 2020-10-05 NOTE — PLAN OF CARE
Behavior:  Patient attended and participated in psychotherapy group today. He was dressed in hospital scrubs and wearing a mask.    Intervention:  The Five Stages of Change was discussed in psychotherapy group this date. Patients identified what stages of change they believed that they are in using handouts P/C/P -1.2 and P/C/P - 1.1 from the Group Therapy for Substance Abuse, second edition, 2016. A Stages of Change Manual. Group discussion was then had about whether patients had a change in their life that they were thinking of making and what stage of change they are in.    Response:  The patient stated that he is in the action stage because he is avoiding triggers. He has moved to get away from his friends / bad crowd.     Plan:    To continue to encourage patient to attend group psychotherapy and to learn more about the Five Stages of Change.

## 2020-10-05 NOTE — NURSING
Pt discharge arranged for today.  All belongings accounted for and will be returned upon discharge.  DC instructions and meds reviewed with pt, understanding verbalized.  Pt denies any S/I or H/i at this time.  No acute distress apparent.  Pt girlfriend coming to pick him up.
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
## 2020-10-05 NOTE — PROGRESS NOTES
PSYCHOTHERAPY ADD-ON +32570   30 (16-37*) minutes     Time: 16 minutes  Participants: Met with patient     Therapeutic Intervention Type: behavior modifying psychotherapy, supportive psychotherapy  Why chosen therapy is appropriate versus another modality: relevant to diagnosis, patient responds to this modality, evidence based practice     Target symptoms: depression, anxiety   Primary focus: psychosocial stressors  Psychotherapeutic techniques: supportive techniques; safety planning and wrap up session     Outcome monitoring methods: self-report, observation     Patient's response to intervention:  The patient's response to intervention is accepting.     Progress toward goals:  The patient's progress toward goals is good.    Leander Salcido MD  Psychiatry

## 2020-10-05 NOTE — PROGRESS NOTES
"   10/05/20 1040   [facility] Group Therapy   Group Name Therapeutic Recreation   Specific Interventions Cognitive Stimulation Training   Participation Level Appropriate;Attentive;Sharing   Participation Quality Cooperative;Social   Insight/Motivation Applies New Skills;Good   Affect/Mood Display Appropriate   Cognition Alert   Psychomotor WNL   Patient reports "feeling great" mood and states "I'm ready to move forward and be responsible."  "

## 2020-10-05 NOTE — PSYCH
Patient will be following up with [facility] at [address] [place] [address] in [place], [phone].  This is a walk in clinic and patient was instructed to go there Monday through Friday between 8 am and 2 pm.  Patient does not use tobacco products but will receive substance abuse therapy and addictions counseling due to a positive UDS on admit.  AVS faxed on 10/5/2020 at 9: 11 am to [phone]

## 2020-10-05 NOTE — PLAN OF CARE
Out and about the unit.  Calm, cooperative.  Appropriate interaction with peers and staff.  Improved mood and affect.  Good appetite.  Pt states that he is being discharged tomorrow and that he is happy to be going home.  Denies SI/HI.  Maintaining a reality based orientation.  All precautions maintained.

## 2022-07-07 NOTE — PROGRESS NOTES
"   10/05/20 1019   Assessment   Patient's Identification of the Problem Patient calm, cooperative and reports "I'm feeling a hundred percent better, I can'r complain." Patient states his admit is due to depression and suicidal thoughts after an argument with his girlfriend." Patient denies suicidal thoughts at this time. Patient reports his mother   and brother   and he gets depressed on their birthday(mom's birthdayS and brother birthday Dec. 22). Patient denies alcohol or drug use. Patient reports he has a girlfriend(pregnant, expecting their first child), 9th grade education, employed at a restaurant, lives in [place] with relatives. Patient verbalized main goal "to change my life because I have responsibilities).   Leisure Interest Watching TV;Exercise;Listen to Music;Sit Outside;Cooking;Video Games;Classical/Table Games;Sports   Leisure Barriers Loss of Interest;Cognitive Skill Level;Lack of Finances;Fears/Phobias;Other (See Comments)  (fear frog)   Treatment Focus To Improve Mood;To Increase Motivation;To Improve Leisure Awareness/Lifestyle/Interest;To Promote Successful and Safe Self Expression;To Improve Coping Skills     Treatment Recommendation:   1:1 Intervention (as needed)    Cognitive Stimulation Skilled Activity  Creative Expression Skilled Activity  Mild Exercises Skilled Activity  Stress Management Skilled Activity  Coping Skilled Activity  Leisure Education and Awareness Skilled Activity    Treatment Goal(s):  Long Term Goals Refer To Master Treatment Plan    Short Term Treatment Goal(s)  Patient Will:  Exhibit Improvement in Mood  Demonstrate Constructive Expression of Feelings and Behavior  Identify at Least 2 Coping Skills or Leisure Skills to Reduce Depression and Hopelessness Upon Request from Therapist    Discharge Recommendations:  Encourage Patient to Actively Utilize Available Community Resources to Increase Leisure Involvement to Decrease Signs and Symptoms of " Illness  Follow Up with After Care Appointments  Continue with Current Leisure Activities   Additional Notes: Patient consent was obtained to proceed with the visit and recommended plan of care after discussion of all risks and benefits, including the risks of COVID-19 exposure. Render Risk Assessment In Note?: yes Detail Level: Simple